# Patient Record
Sex: FEMALE | Race: WHITE | NOT HISPANIC OR LATINO | URBAN - METROPOLITAN AREA
[De-identification: names, ages, dates, MRNs, and addresses within clinical notes are randomized per-mention and may not be internally consistent; named-entity substitution may affect disease eponyms.]

---

## 2018-05-01 ENCOUNTER — APPOINTMENT (RX ONLY)
Dept: URBAN - METROPOLITAN AREA CLINIC 49 | Facility: CLINIC | Age: 28
Setting detail: DERMATOLOGY
End: 2018-05-01

## 2018-05-01 DIAGNOSIS — L23.9 ALLERGIC CONTACT DERMATITIS, UNSPECIFIED CAUSE: ICD-10-CM

## 2018-05-01 PROCEDURE — 99202 OFFICE O/P NEW SF 15 MIN: CPT

## 2018-05-01 PROCEDURE — ? PRESCRIPTION

## 2018-05-01 PROCEDURE — ? COUNSELING

## 2018-05-01 RX ORDER — TRIAMCINOLONE ACETONIDE 1 MG/G
CREAM TOPICAL
Qty: 1 | Refills: 1 | Status: ERX | COMMUNITY
Start: 2018-05-01

## 2018-05-01 RX ADMIN — TRIAMCINOLONE ACETONIDE: 1 CREAM TOPICAL at 20:13

## 2018-05-01 ASSESSMENT — LOCATION ZONE DERM
LOCATION ZONE: ARM
LOCATION ZONE: TRUNK
LOCATION ZONE: NECK

## 2018-05-01 ASSESSMENT — LOCATION SIMPLE DESCRIPTION DERM
LOCATION SIMPLE: LEFT ANTERIOR NECK
LOCATION SIMPLE: ABDOMEN
LOCATION SIMPLE: LEFT FOREARM

## 2018-05-01 ASSESSMENT — LOCATION DETAILED DESCRIPTION DERM
LOCATION DETAILED: LEFT SUPERIOR ANTERIOR NECK
LOCATION DETAILED: LEFT VENTRAL DISTAL FOREARM
LOCATION DETAILED: LEFT INFERIOR LATERAL NECK
LOCATION DETAILED: LEFT LATERAL ABDOMEN

## 2018-05-01 NOTE — HPI: RASH
Is This A New Presentation, Or A Follow-Up?: Rash
Additional History: Pt states that  has  has a similar rash but he was diagnosed with mild eczema. Pt is currently trying to get pregnant.

## 2018-05-04 ENCOUNTER — APPOINTMENT (RX ONLY)
Dept: URBAN - METROPOLITAN AREA CLINIC 49 | Facility: CLINIC | Age: 28
Setting detail: DERMATOLOGY
End: 2018-05-04

## 2018-05-04 DIAGNOSIS — L23.9 ALLERGIC CONTACT DERMATITIS, UNSPECIFIED CAUSE: ICD-10-CM

## 2018-05-04 PROCEDURE — ? COUNSELING

## 2018-05-04 PROCEDURE — 99213 OFFICE O/P EST LOW 20 MIN: CPT

## 2018-05-04 PROCEDURE — ? PRESCRIPTION

## 2018-05-04 RX ORDER — PREDNISONE 5 MG/1
TABLET ORAL
Qty: 1 | Refills: 0 | Status: ERX | COMMUNITY
Start: 2018-05-04

## 2018-05-04 RX ADMIN — PREDNISONE: 5 TABLET ORAL at 19:08

## 2018-05-04 ASSESSMENT — LOCATION DETAILED DESCRIPTION DERM
LOCATION DETAILED: RIGHT INFERIOR ANTERIOR NECK
LOCATION DETAILED: LEFT VENTRAL LATERAL DISTAL FOREARM
LOCATION DETAILED: RIGHT VENTRAL PROXIMAL FOREARM
LOCATION DETAILED: LEFT VENTRAL DISTAL FOREARM
LOCATION DETAILED: LEFT LATERAL ABDOMEN
LOCATION DETAILED: RIGHT LATERAL ABDOMEN

## 2018-05-04 ASSESSMENT — LOCATION ZONE DERM
LOCATION ZONE: NECK
LOCATION ZONE: ARM
LOCATION ZONE: TRUNK

## 2018-05-04 ASSESSMENT — LOCATION SIMPLE DESCRIPTION DERM
LOCATION SIMPLE: RIGHT ANTERIOR NECK
LOCATION SIMPLE: RIGHT FOREARM
LOCATION SIMPLE: ABDOMEN
LOCATION SIMPLE: LEFT FOREARM

## 2018-05-04 NOTE — HPI: RASH (ALLERGIC CONTACT DERMATITIS)
Response To Previous Treatment?: topical treatments are ineffective
Is This A New Presentation, Or A Follow-Up?: Follow Up Allergic Contact Dermatitis

## 2018-05-18 ENCOUNTER — APPOINTMENT (RX ONLY)
Dept: URBAN - METROPOLITAN AREA CLINIC 49 | Facility: CLINIC | Age: 28
Setting detail: DERMATOLOGY
End: 2018-05-18

## 2018-05-18 DIAGNOSIS — L81.8 OTHER SPECIFIED DISORDERS OF PIGMENTATION: ICD-10-CM

## 2018-05-18 PROCEDURE — 99212 OFFICE O/P EST SF 10 MIN: CPT

## 2018-05-18 PROCEDURE — ? COUNSELING

## 2018-05-18 ASSESSMENT — LOCATION SIMPLE DESCRIPTION DERM
LOCATION SIMPLE: LEFT FOREARM
LOCATION SIMPLE: RIGHT FOREARM

## 2018-05-18 ASSESSMENT — LOCATION DETAILED DESCRIPTION DERM
LOCATION DETAILED: RIGHT VENTRAL PROXIMAL FOREARM
LOCATION DETAILED: LEFT VENTRAL LATERAL DISTAL FOREARM

## 2018-05-18 ASSESSMENT — LOCATION ZONE DERM: LOCATION ZONE: ARM

## 2020-06-05 ENCOUNTER — VIRTUAL VISIT (OUTPATIENT)
Dept: FAMILY MEDICINE CLINIC | Age: 30
End: 2020-06-05

## 2020-06-05 RX ORDER — DROSPIRENONE AND ETHINYL ESTRADIOL 0.02-3(28)
KIT ORAL DAILY
COMMUNITY

## 2020-06-05 NOTE — PROGRESS NOTES
1. Have you been to the ER, urgent care clinic since your last visit? Hospitalized since your last visit? No    2. Have you seen or consulted any other health care providers outside of the [address] [place] [address] since your last visit? Include any pap smears or colon screening. Yes, saw her obgyn in 5/2020 for pap smear. Chief Complaint   Patient presents with    New Patient     Previous PCP was in [address] [place] [address] in [place].

## 2020-06-08 ENCOUNTER — HOSPITAL ENCOUNTER (OUTPATIENT)
Dept: LAB | Age: 30
Discharge: HOME | End: 2020-06-08
Payer: OTHER GOVERNMENT

## 2020-06-08 ENCOUNTER — OFFICE VISIT (OUTPATIENT)
Dept: FAMILY MEDICINE CLINIC | Age: 30
End: 2020-06-08

## 2020-06-08 VITALS
TEMPERATURE: 97.9 F | DIASTOLIC BLOOD PRESSURE: 81 MMHG | RESPIRATION RATE: 18 BRPM | SYSTOLIC BLOOD PRESSURE: 126 MMHG | OXYGEN SATURATION: 100 % | HEART RATE: 69 BPM

## 2020-06-08 DIAGNOSIS — Z00.00 WELL WOMAN EXAM (NO GYNECOLOGICAL EXAM): ICD-10-CM

## 2020-06-08 DIAGNOSIS — E55.9 VITAMIN D DEFICIENCY: ICD-10-CM

## 2020-06-08 DIAGNOSIS — D72.818 OTHER DECREASED WHITE BLOOD CELL (WBC) COUNT: ICD-10-CM

## 2020-06-08 DIAGNOSIS — Z00.00 WELL WOMAN EXAM (NO GYNECOLOGICAL EXAM): Primary | ICD-10-CM

## 2020-06-08 LAB
25(OH)D3 SERPL-MCNC: 30.2 NG/ML (ref 30–100)
ALBUMIN SERPL-MCNC: 3.9 G/DL (ref 3.4–5)
ALBUMIN/GLOB SERPL: 1.1 {RATIO} (ref 0.8–1.7)
ALP SERPL-CCNC: 45 U/L (ref 45–117)
ALT SERPL-CCNC: 45 U/L (ref 13–56)
ANION GAP SERPL CALC-SCNC: 5 MMOL/L (ref 3–18)
AST SERPL-CCNC: 25 U/L (ref 10–38)
BILIRUB SERPL-MCNC: 0.4 MG/DL (ref 0.2–1)
BUN SERPL-MCNC: 8 MG/DL (ref 7–18)
BUN/CREAT SERPL: 10 (ref 12–20)
CALCIUM SERPL-MCNC: 9.3 MG/DL (ref 8.5–10.1)
CHLORIDE SERPL-SCNC: 109 MMOL/L (ref 100–111)
CHOLEST SERPL-MCNC: 150 MG/DL
CO2 SERPL-SCNC: 28 MMOL/L (ref 21–32)
CREAT SERPL-MCNC: 0.8 MG/DL (ref 0.6–1.3)
ERYTHROCYTE [DISTWIDTH] IN BLOOD BY AUTOMATED COUNT: 12.5 % (ref 11.6–14.5)
GLOBULIN SER CALC-MCNC: 3.5 G/DL (ref 2–4)
GLUCOSE SERPL-MCNC: 81 MG/DL (ref 74–99)
HCT VFR BLD AUTO: 40.4 % (ref 35–45)
HDLC SERPL-MCNC: 58 MG/DL (ref 40–60)
HDLC SERPL: 2.6 {RATIO} (ref 0–5)
HGB BLD-MCNC: 12.9 G/DL (ref 12–16)
LDLC SERPL CALC-MCNC: 69.2 MG/DL (ref 0–100)
LIPID PROFILE,FLP: NORMAL
MCH RBC QN AUTO: 28.1 PG (ref 24–34)
MCHC RBC AUTO-ENTMCNC: 31.9 G/DL (ref 31–37)
MCV RBC AUTO: 88 FL (ref 74–97)
PLATELET # BLD AUTO: 262 K/UL (ref 135–420)
PMV BLD AUTO: 13.8 FL (ref 9.2–11.8)
POTASSIUM SERPL-SCNC: 4.5 MMOL/L (ref 3.5–5.5)
PROT SERPL-MCNC: 7.4 G/DL (ref 6.4–8.2)
RBC # BLD AUTO: 4.59 M/UL (ref 4.2–5.3)
SODIUM SERPL-SCNC: 142 MMOL/L (ref 136–145)
TRIGL SERPL-MCNC: 114 MG/DL (ref ?–150)
VLDLC SERPL CALC-MCNC: 22.8 MG/DL
WBC # BLD AUTO: 4.4 K/UL (ref 4.6–13.2)

## 2020-06-08 PROCEDURE — 85027 COMPLETE CBC AUTOMATED: CPT

## 2020-06-08 PROCEDURE — 80053 COMPREHEN METABOLIC PANEL: CPT

## 2020-06-08 PROCEDURE — 82306 VITAMIN D 25 HYDROXY: CPT

## 2020-06-08 PROCEDURE — 80061 LIPID PANEL: CPT

## 2020-06-08 PROCEDURE — 36415 COLL VENOUS BLD VENIPUNCTURE: CPT

## 2020-06-08 NOTE — PROGRESS NOTES
1. Have you been to the ER, urgent care clinic since your last visit? Hospitalized since your last visit? No.     2. Have you seen or consulted any other health care providers outside of the [address] [place] [address] since your last visit? Include any pap smears or colon screening.  No.    Chief Complaint   Patient presents with    Complete Physical

## 2020-06-08 NOTE — PROGRESS NOTES
Kaycee Gregory               [phone]  Subjective:   26 y.o. female for Well Woman Check. Her gyne and breast care is done elsewhere by her Ob-Gyne physician. There is no problem list on file for this patient. There are no active problems to display for this patient. Current Outpatient Medications   Medication Sig Dispense Refill    PRENATAL 148-IRON-FOLATE 6-DHA PO Take  by mouth daily.  drospirenone-ethinyl estradioL (Yolanda, 28,) 3-0.02 mg tab Take  by mouth daily. Allergies   Allergen Reactions    Beef Derived (Bovine) Diarrhea and Nausea and Vomiting     History reviewed. No pertinent past medical history. Past Surgical History:   Procedure Laterality Date    HX  SECTION       Family History   Problem Relation Age of Onset    No Known Problems Mother     COPD Father     No Known Problems Brother      Social History     Tobacco Use    Smoking status: Never Smoker    Smokeless tobacco: Never Used   Substance Use Topics    Alcohol use: Yes     Alcohol/week: 4.0 standard drinks     Types: 4 Glasses of wine per week             ROS: Feeling generally well. No TIA's or unusual headaches, no dysphagia. No prolonged cough. No dyspnea or chest pain on exertion. No abdominal pain, change in bowel habits, black or bloody stools. Endorses loose/soft stools for the past couple of weeks. Has one stool a day, brown in color . Recently moved here from [place], lives in [place]. No urinary tract symptoms. No new or unusual musculoskeletal symptoms. No HEENT symptoms. No psychiatric symptoms. Specific concerns today: none    Objective: The patient appears well, alert, oriented x 3, in no distress. Visit Vitals  /81   Pulse 69   Temp 97.9 °F (36.6 °C) (Oral)   Resp 18   LMP 2020   SpO2 100%     ENT normal.  Neck supple. No adenopathy or thyromegaly. GALI.  Lungs are clear, good air entry, no wheezes, rhonchi or rales. S1 and S2 normal, no murmurs, regular rate and rhythm. Abdomen soft without tenderness, guarding, mass or organomegaly. Extremities show no edema, normal peripheral pulses. Neurological is normal, no focal findings. Breast and Pelvic exams are deferred. Assessment/Plan:   Well Woman  limit alcohol consumption, continue present diet with no restrictions, continue present plan, routine labs ordered    ICD-10-CM ICD-9-CM     1. Well woman exam (no gynecological exam) R48.35 I45.3 METABOLIC PANEL, COMPREHENSIVE Here to establish care  No medical history or current problems  Labs appropriate for age and medical condidions      LIPID PANEL       CBC W/O DIFF     [V70.0]    2. Vitamin D deficiency E55.9 268.9 VITAMIN D, 25 HYDROXY F/u vit d level  Education on osteoporosis prevention provided, recommend daily intake of calcium and vitd along with weight bearing exercises       An After Visit Summary was printed and given to the patient. All diagnosis have been discussed with the patient and all of the patient's questions have been answered. Follow-up and Dispositions    · Return in about 1 year (around 6/8/2021) for Annual physical, w/o gyn, 30 minutes, office visit. Health Maintenance Due   Topic Date Due    DTaP/Tdap/Td series (1 - Tdap) 06/10/2010    PAP AKA CERVICAL CYTOLOGY  06/10/2010         MICHELLE Gomez-[address].   [facility], [place] [address]

## 2020-06-11 NOTE — PROGRESS NOTES
Patient is aware that her labs showed her WBC count was slightly low and needed to recheck her labs again in 4 weeks. SHe is scheduled for 7/14/2020 for lab appt.

## 2020-07-14 ENCOUNTER — HOSPITAL ENCOUNTER (OUTPATIENT)
Dept: LAB | Age: 30
Discharge: HOME | End: 2020-07-14
Payer: OTHER GOVERNMENT

## 2020-07-14 DIAGNOSIS — D72.818 OTHER DECREASED WHITE BLOOD CELL (WBC) COUNT: ICD-10-CM

## 2020-07-14 LAB
BASOPHILS # BLD: 0 K/UL (ref 0–0.1)
BASOPHILS NFR BLD: 1 % (ref 0–2)
DIFFERENTIAL METHOD BLD: ABNORMAL
EOSINOPHIL # BLD: 0.1 K/UL (ref 0–0.4)
EOSINOPHIL NFR BLD: 2 % (ref 0–5)
ERYTHROCYTE [DISTWIDTH] IN BLOOD BY AUTOMATED COUNT: 12.5 % (ref 11.6–14.5)
HCT VFR BLD AUTO: 39.3 % (ref 35–45)
HGB BLD-MCNC: 12.7 G/DL (ref 12–16)
LYMPHOCYTES # BLD: 1.4 K/UL (ref 0.9–3.6)
LYMPHOCYTES NFR BLD: 45 % (ref 21–52)
MCH RBC QN AUTO: 28.7 PG (ref 24–34)
MCHC RBC AUTO-ENTMCNC: 32.3 G/DL (ref 31–37)
MCV RBC AUTO: 88.7 FL (ref 74–97)
MONOCYTES # BLD: 0.4 K/UL (ref 0.05–1.2)
MONOCYTES NFR BLD: 11 % (ref 3–10)
NEUTS SEG # BLD: 1.3 K/UL (ref 1.8–8)
NEUTS SEG NFR BLD: 41 % (ref 40–73)
PLATELET # BLD AUTO: 213 K/UL (ref 135–420)
PMV BLD AUTO: 14.1 FL (ref 9.2–11.8)
RBC # BLD AUTO: 4.43 M/UL (ref 4.2–5.3)
WBC # BLD AUTO: 3.1 K/UL (ref 4.6–13.2)

## 2020-07-14 PROCEDURE — 36415 COLL VENOUS BLD VENIPUNCTURE: CPT

## 2020-07-14 PROCEDURE — 85025 COMPLETE CBC W/AUTO DIFF WBC: CPT

## 2020-07-21 ENCOUNTER — TELEPHONE (OUTPATIENT)
Dept: FAMILY MEDICINE CLINIC | Age: 30
End: 2020-07-21

## 2020-07-21 DIAGNOSIS — D70.9 NEUTROPENIA, UNSPECIFIED TYPE (HCC): Primary | ICD-10-CM

## 2020-07-22 NOTE — TELEPHONE ENCOUNTER
Pt called for a second time, I notified her that you've been with patients all day and will call her once you read them . Her # [phone].

## 2020-07-23 NOTE — TELEPHONE ENCOUNTER
Returned call. She talked to her mom and her mom has low WBC also, she had workup and there were no significant findings. Will refer to hematology for further workup, suggested she bring her mothers records with her on the visit    Referral placed    All diagnosis have been discussed with the patient and all of the patient's questions have been answered.

## 2020-09-22 ENCOUNTER — OFFICE VISIT (OUTPATIENT)
Age: 30
End: 2020-09-22
Payer: OTHER GOVERNMENT

## 2020-09-22 ENCOUNTER — HOSPITAL ENCOUNTER (OUTPATIENT)
Dept: INFUSION THERAPY | Age: 30
Discharge: HOME | End: 2020-09-22
Payer: OTHER GOVERNMENT

## 2020-09-22 VITALS
WEIGHT: 123 LBS | OXYGEN SATURATION: 97 % | SYSTOLIC BLOOD PRESSURE: 129 MMHG | HEART RATE: 65 BPM | DIASTOLIC BLOOD PRESSURE: 81 MMHG | RESPIRATION RATE: 18 BRPM | TEMPERATURE: 98 F

## 2020-09-22 VITALS
DIASTOLIC BLOOD PRESSURE: 81 MMHG | RESPIRATION RATE: 18 BRPM | SYSTOLIC BLOOD PRESSURE: 129 MMHG | OXYGEN SATURATION: 97 % | TEMPERATURE: 98 F | HEART RATE: 65 BPM

## 2020-09-22 DIAGNOSIS — D70.9 NEUTROPENIA, UNSPECIFIED TYPE (HCC): Primary | ICD-10-CM

## 2020-09-22 DIAGNOSIS — D70.9 NEUTROPENIA, UNSPECIFIED TYPE (HCC): ICD-10-CM

## 2020-09-22 LAB
ALBUMIN SERPL-MCNC: 4.2 G/DL (ref 3.4–5)
ALBUMIN/GLOB SERPL: 1.1 {RATIO} (ref 0.8–1.7)
ALP SERPL-CCNC: 54 U/L (ref 45–117)
ALT SERPL-CCNC: 62 U/L (ref 13–56)
ANION GAP SERPL CALC-SCNC: 3 MMOL/L (ref 3–18)
AST SERPL-CCNC: 34 U/L (ref 10–38)
BASOPHILS # BLD: 0 K/UL (ref 0–0.1)
BASOPHILS NFR BLD: 1 % (ref 0–2)
BILIRUB SERPL-MCNC: 0.5 MG/DL (ref 0.2–1)
BUN SERPL-MCNC: 9 MG/DL (ref 7–18)
BUN/CREAT SERPL: 13 (ref 12–20)
CALCIUM SERPL-MCNC: 9.3 MG/DL (ref 8.5–10.1)
CHLORIDE SERPL-SCNC: 107 MMOL/L (ref 100–111)
CO2 SERPL-SCNC: 27 MMOL/L (ref 21–32)
CREAT SERPL-MCNC: 0.7 MG/DL (ref 0.6–1.3)
DIFFERENTIAL METHOD BLD: ABNORMAL
EOSINOPHIL # BLD: 0 K/UL (ref 0–0.4)
EOSINOPHIL NFR BLD: 1 % (ref 0–5)
ERYTHROCYTE [DISTWIDTH] IN BLOOD BY AUTOMATED COUNT: 12.3 % (ref 11.6–14.5)
FERRITIN SERPL-MCNC: 11 NG/ML (ref 8–388)
FOLATE SERPL-MCNC: >20 NG/ML (ref 3.1–17.5)
GLOBULIN SER CALC-MCNC: 3.8 G/DL (ref 2–4)
GLUCOSE SERPL-MCNC: 83 MG/DL (ref 74–99)
HCT VFR BLD AUTO: 47.8 % (ref 35–45)
HGB BLD-MCNC: 15.4 G/DL (ref 12–16)
IRON SATN MFR SERPL: 23 % (ref 20–50)
IRON SERPL-MCNC: 103 UG/DL (ref 50–175)
LYMPHOCYTES # BLD: 1.6 K/UL (ref 0.9–3.6)
LYMPHOCYTES NFR BLD: 33 % (ref 21–52)
MCH RBC QN AUTO: 28.8 PG (ref 24–34)
MCHC RBC AUTO-ENTMCNC: 32.2 G/DL (ref 31–37)
MCV RBC AUTO: 89.5 FL (ref 74–97)
MONOCYTES # BLD: 0.3 K/UL (ref 0.05–1.2)
MONOCYTES NFR BLD: 6 % (ref 3–10)
NEUTS SEG # BLD: 2.8 K/UL (ref 1.8–8)
NEUTS SEG NFR BLD: 59 % (ref 40–73)
PLATELET # BLD AUTO: 264 K/UL (ref 135–420)
PMV BLD AUTO: 12.4 FL (ref 9.2–11.8)
POTASSIUM SERPL-SCNC: 4.2 MMOL/L (ref 3.5–5.5)
PROT SERPL-MCNC: 8 G/DL (ref 6.4–8.2)
RBC # BLD AUTO: 5.34 M/UL (ref 4.2–5.3)
SODIUM SERPL-SCNC: 137 MMOL/L (ref 136–145)
TIBC SERPL-MCNC: 445 UG/DL (ref 250–450)
TSH SERPL DL<=0.05 MIU/L-ACNC: 1.51 UIU/ML (ref 0.36–3.74)
VIT B12 SERPL-MCNC: 337 PG/ML (ref 211–911)
WBC # BLD AUTO: 4.7 K/UL (ref 4.6–13.2)

## 2020-09-22 PROCEDURE — 99204 OFFICE O/P NEW MOD 45 MIN: CPT | Performed by: INTERNAL MEDICINE

## 2020-09-22 PROCEDURE — 85025 COMPLETE CBC W/AUTO DIFF WBC: CPT

## 2020-09-22 PROCEDURE — 83540 ASSAY OF IRON: CPT

## 2020-09-22 PROCEDURE — 86038 ANTINUCLEAR ANTIBODIES: CPT

## 2020-09-22 PROCEDURE — 82784 ASSAY IGA/IGD/IGG/IGM EACH: CPT

## 2020-09-22 PROCEDURE — 82728 ASSAY OF FERRITIN: CPT

## 2020-09-22 PROCEDURE — 36415 COLL VENOUS BLD VENIPUNCTURE: CPT

## 2020-09-22 PROCEDURE — 86618 LYME DISEASE ANTIBODY: CPT

## 2020-09-22 PROCEDURE — 84443 ASSAY THYROID STIM HORMONE: CPT

## 2020-09-22 PROCEDURE — 82607 VITAMIN B-12: CPT

## 2020-09-22 PROCEDURE — 87389 HIV-1 AG W/HIV-1&-2 AB AG IA: CPT

## 2020-09-22 PROCEDURE — 80053 COMPREHEN METABOLIC PANEL: CPT

## 2020-09-22 NOTE — PROGRESS NOTES
[facility]  [id number] [place] [address], [address]  [place], [facility]  Office Phone: [phone]  Fax: [phone]      Reason for visit:  Abnormal White Blood Cell Count (Neutropenia)      HPI:   Connie Will is a 31 y.o.  female who I was asked to see in consultation at the request of Rashad Fisher NP for evaluation for leukopenia. On 2020, WBC 3.1, H&H 12.7/39.3, platelet 444. ANC 1.3 (one-point 88). I saw and examined patient today. She is awake alert oriented x3. On review of systems she denies any fevers, chills, repeated infection, nausea vomiting abdominal pain. No focal neurologic deficit. All other point of review of system have been reviewed and were negative. ECOG performance status 0. Independent with ADLs and IADLs    DX   Encounter Diagnosis   Name Primary?  Neutropenia, unspecified type ([facility] Yes          History reviewed. No pertinent past medical history. Past Surgical History:   Procedure Laterality Date    HX  SECTION  2019     Social History     Socioeconomic History    Marital status:      Spouse name: Not on file    Number of children: Not on file    Years of education: Not on file    Highest education level: Not on file   Tobacco Use    Smoking status: Never Smoker    Smokeless tobacco: Never Used   Substance and Sexual Activity    Alcohol use: Yes     Alcohol/week: 2.0 standard drinks     Types: 2 Glasses of wine per week    Drug use: Never    Sexual activity: Yes     Family History   Problem Relation Age of Onset    No Known Problems Mother     COPD Father     No Known Problems Brother        Current Outpatient Medications   Medication Sig Dispense Refill    PRENATAL 148-IRON-FOLATE 6-DHA PO Take  by mouth daily.  drospirenone-ethinyl estradioL (Yolanda, Nuvia,) 3-0.02 mg tab Take  by mouth daily.          Allergies   Allergen Reactions    Beef Derived (Bovine) Diarrhea and Nausea and Vomiting Patient states \"just diarrhea, anything from a cow\"       Review of Systems  As per HPi    Objective:  Physical Exam:  Visit Vitals  /81   Pulse 65   Temp 98 °F (36.7 °C) (Oral)   Resp 18   Wt 55.8 kg (123 lb)   LMP 09/10/2020   SpO2 97%       General:  Alert, cooperative, no distress, appears stated age. Head:  Normocephalic, without obvious abnormality, atraumatic. Eyes:  Conjunctivae/corneas clear. PERRL, EOMs intact. Throat: Lips, mucosa, and tongue normal.    Neck: Supple, symmetrical, trachea midline, no adenopathy, thyroid: no enlargement/tenderness/nodules   Back:   Symmetric, no curvature. ROM normal. No CVA tenderness. Lungs:   Clear to auscultation bilaterally. Chest wall:  No tenderness or deformity. Heart:  Regular rate and rhythm, S1, S2 normal, no murmur, click, rub or gallop. Abdomen:   Soft, non-tender. Bowel sounds normal. No masses,  No organomegaly. Extremities: Extremities normal, atraumatic, no cyanosis or edema. Skin: Skin color, texture, turgor normal. No rashes or lesions. Lymph nodes: Cervical, supraclavicular, and axillary nodes normal.   Neurologic: CNII-XII intact. Diagnostic Imaging     No results found for this or any previous visit. Lab Results  Lab Results   Component Value Date/Time    WBC 3.1 (L) 07/14/2020 09:17 AM    HGB 12.7 07/14/2020 09:17 AM    HCT 39.3 07/14/2020 09:17 AM    PLATELET 021 91/54/3642 09:17 AM    MCV 88.7 07/14/2020 09:17 AM       Lab Results   Component Value Date/Time    Sodium 142 06/08/2020 10:40 AM    Potassium 4.5 06/08/2020 10:40 AM    Chloride 109 06/08/2020 10:40 AM    CO2 28 06/08/2020 10:40 AM    Anion gap 5 06/08/2020 10:40 AM    Glucose 81 06/08/2020 10:40 AM    BUN 8 06/08/2020 10:40 AM    Creatinine 0.80 06/08/2020 10:40 AM    BUN/Creatinine ratio 10 (L) 06/08/2020 10:40 AM    GFR est AA >60 06/08/2020 10:40 AM    GFR est non-AA >60 06/08/2020 10:40 AM    Calcium 9.3 06/08/2020 10:40 AM    Alk.  phosphatase 45 06/08/2020 10:40 AM    Protein, total 7.4 06/08/2020 10:40 AM    Albumin 3.9 06/08/2020 10:40 AM    Globulin 3.5 06/08/2020 10:40 AM    A-G Ratio 1.1 06/08/2020 10:40 AM    ALT (SGPT) 45 06/08/2020 10:40 AM   Follow-up with PCP for health maintenance  Assessment/Plan:  31 y.o. female with   1. Neutropenia, unspecified type ([facility]  I had a long discussion with patient regarding her very mild neutropenia 1.3 (1.88.0). She has a family history of her mother who also had neutropenia and she said. I reviewed her medication there is no culprit medication for this. She denies any fevers or repeated infections. She has no history of rheumatoid arthritis. She grew up in [place] (check lyme serology)  - CBC WITH AUTOMATED DIFF; Future  - FERRITIN; Future  - IRON PROFILE; Future  - METABOLIC PANEL, COMPREHENSIVE; Future  - PERIPHERAL SMEAR; Future  - TSH 3RD GENERATION; Future  - VITAMIN B12 & FOLATE; Future  - HIV 1/2 AG/AB, 4TH GENERATION,W RFLX CONFIRM; Future  - ANTINUCLEAR ANTIBODIES, IFA;  Future  - IgG,A and M to r/o CIVD  - Lyme serology    Return in 2 weeks-Virtual

## 2020-09-22 NOTE — PROGRESS NOTES
Jelly Maria is a 31 y.o. female presenting today for a new patient appointment. Patient is ambulatory with no assistive devices and denies any complaints. Chief Complaint   Patient presents with    Abnormal White Blood Cell Count     Neutropenia       Visit Vitals  /81   Pulse 65   Temp 98 °F (36.7 °C) (Oral)   Resp 18   Wt 123 lb (55.8 kg)   LMP 09/10/2020   SpO2 97%       Current Outpatient Medications   Medication Sig    PRENATAL 148-IRON-FOLATE 6-DHA PO Take  by mouth daily.  drospirenone-ethinyl estradioL (Yolanda, 28,) 3-0.02 mg tab Take  by mouth daily. No current facility-administered medications for this visit. Medications no longer taking/discontinued: Yolanda    Fall Risk Assessment, last 12 mths 6/5/2020   Able to walk? Yes   Fall in past 12 months? No       3 most recent PHQ Screens 6/8/2020   Little interest or pleasure in doing things Not at all   Feeling down, depressed, irritable, or hopeless Not at all   Total Score PHQ 2 0       No flowsheet data found.     Learning Assessment 6/5/2020   PRIMARY LEARNER Patient   HIGHEST LEVEL OF EDUCATION - PRIMARY LEARNER  > 4 YEARS OF COLLEGE   BARRIERS PRIMARY LEARNER NONE   CO-LEARNER CAREGIVER No   PRIMARY LANGUAGE ENGLISH   LEARNER PREFERENCE PRIMARY DEMONSTRATION     READING   ANSWERED BY self   RELATIONSHIP SELF

## 2020-09-22 NOTE — PROGRESS NOTES
[facility] Progress Note    Date: 2020    Name: Willie Choi    MRN: [id number]         : 1990    Peripheral Lab Draw      Ms. Zuluaga Brothers to [facility], ambulatory at [id number] accompanied by self. Pt was assessed and education was provided. Ms. Ambar Dahl vitals were reviewed and patient was observed for 5 minutes prior to treatment. Visit Vitals  /81   Pulse 65   Temp 98 °F (36.7 °C) (Oral)   Resp 18   SpO2 97%     Recent Results (from the past 12 hour(s))   METABOLIC PANEL, COMPREHENSIVE    Collection Time: 20 11:05 AM   Result Value Ref Range    Sodium 137 136 - 145 mmol/L    Potassium 4.2 3.5 - 5.5 mmol/L    Chloride 107 100 - 111 mmol/L    CO2 27 21 - 32 mmol/L    Anion gap 3 3.0 - 18 mmol/L    Glucose 83 74 - 99 mg/dL    BUN 9 7.0 - 18 MG/DL    Creatinine 0.70 0.6 - 1.3 MG/DL    BUN/Creatinine ratio 13 12 - 20      GFR est AA >60 >60 ml/min/1.73m2    GFR est non-AA >60 >60 ml/min/1.73m2    Calcium 9.3 8.5 - 10.1 MG/DL    Bilirubin, total 0.5 0.2 - 1.0 MG/DL    ALT (SGPT) 62 (H) 13 - 56 U/L    AST (SGOT) 34 10 - 38 U/L    Alk. phosphatase 54 45 - 117 U/L    Protein, total 8.0 6.4 - 8.2 g/dL    Albumin 4.2 3.4 - 5.0 g/dL    Globulin 3.8 2.0 - 4.0 g/dL    A-G Ratio 1.1 0.8 - 1.7     TSH 3RD GENERATION    Collection Time: 20 11:05 AM   Result Value Ref Range    TSH 1.51 0.36 - 3.74 uIU/mL   CBC WITH AUTOMATED DIFF    Collection Time: 20 11:05 AM   Result Value Ref Range    WBC 4.7 4.6 - 13.2 K/uL    RBC 5.34 (H) 4.20 - 5.30 M/uL    HGB 15.4 12.0 - 16.0 g/dL    HCT 47.8 (H) 35.0 - 45.0 %    MCV 89.5 74.0 - 97.0 FL    MCH 28.8 24.0 - 34.0 PG    MCHC 32.2 31.0 - 37.0 g/dL    RDW 12.3 11.6 - 14.5 %    PLATELET 560 545 - 468 K/uL    MPV 12.4 (H) 9.2 - 11.8 FL    NEUTROPHILS 59 40 - 73 %    LYMPHOCYTES 33 21 - 52 %    MONOCYTES 6 3 - 10 %    EOSINOPHILS 1 0 - 5 %    BASOPHILS 1 0 - 2 %    ABS. NEUTROPHILS 2.8 1.8 - 8.0 K/UL    ABS. LYMPHOCYTES 1.6 0.9 - 3.6 K/UL    ABS.  MONOCYTES 0.3 0.05 - 1.2 K/UL    ABS. EOSINOPHILS 0.0 0.0 - 0.4 K/UL    ABS. BASOPHILS 0.0 0.0 - 0.1 K/UL    DF AUTOMATED         Blood obtained peripherally from left arm x 1 attempt with butterfly needle and sent to lab for Cbc w/diff, Lyme Ab total, Immunoglobulins, antinuclear AB, Hiv 1/2 AG/AB, Vitamin B12 & Folate, Tsh, Cmp, Iron Profile, Ferritin and Peripheral Smear per written orders. No bleeding or hematoma noted at site. Gauze and coban applied. Ms. Hétcor Wynn tolerated the phlebotomy, and had no complaints. Patient armband removed and shredded. Ms. Héctor Wynn was discharged from [place] [address] in stable condition at .      Tuan Murphy Phlebotomist PCT  September 22, 2020  1:49 PM

## 2020-09-23 LAB
ANA TITR SER IF: NEGATIVE {TITER}
B BURGDOR IGG+IGM SER-ACNC: <0.91 ISR (ref 0–0.9)
HIV 1+2 AB+HIV1 P24 AG SERPL QL IA: NONREACTIVE
HIV12 RESULT COMMENT, HHIVC: NORMAL
IGA SERPL-MCNC: 263 MG/DL (ref 70–400)
IGG SERPL-MCNC: 1410 MG/DL (ref 700–1600)
IGM SERPL-MCNC: 168 MG/DL (ref 40–230)
PERIPHERAL SMEAR,PSM: NORMAL

## 2020-09-28 ENCOUNTER — DOCUMENTATION ONLY (OUTPATIENT)
Age: 30
End: 2020-09-28

## 2020-09-28 DIAGNOSIS — D50.8 IRON DEFICIENCY ANEMIA SECONDARY TO INADEQUATE DIETARY IRON INTAKE: Primary | ICD-10-CM

## 2020-09-28 NOTE — PROGRESS NOTES
Patient refused iron infusion and would rather take oral iron supplementation. I advised her to take Ferrous Sulfate 325mg PO every other day. Repeat labs in 8 weeks per her request. All her questions were answered to her satisfaction. Patient verbalized understanding.

## 2020-10-06 ENCOUNTER — VIRTUAL VISIT (OUTPATIENT)
Age: 30
End: 2020-10-06
Payer: OTHER GOVERNMENT

## 2020-10-06 DIAGNOSIS — E61.1 IRON DEFICIENCY: ICD-10-CM

## 2020-10-06 DIAGNOSIS — D70.9 NEUTROPENIA, UNSPECIFIED TYPE (HCC): Primary | ICD-10-CM

## 2020-10-06 PROCEDURE — 99213 OFFICE O/P EST LOW 20 MIN: CPT | Performed by: INTERNAL MEDICINE

## 2020-10-06 RX ORDER — LANOLIN ALCOHOL/MO/W.PET/CERES
CREAM (GRAM) TOPICAL EVERY OTHER DAY
COMMUNITY

## 2020-10-06 NOTE — PROGRESS NOTES
Polo Joseph is a 31 y.o. female presenting today for a follow-up appointment via Telehealth. Identified patient using two identifiers (full name and ). Patient denies any complaints. Provider was advised. Chief Complaint   Patient presents with    Abnormal White Blood Cell Count     Neutropenia       Current Outpatient Medications   Medication Sig    ferrous sulfate (Iron) 325 mg (65 mg iron) tablet Take  by mouth every other day.  PRENATAL 148-IRON-FOLATE 6-DHA PO Take  by mouth daily.  drospirenone-ethinyl estradioL (Yolanda, 28,) 3-0.02 mg tab Take  by mouth daily. No current facility-administered medications for this visit. Fall Risk Assessment, last 12 mths 2020   Able to walk? Yes   Fall in past 12 months? No       3 most recent PHQ Screens 2020   Little interest or pleasure in doing things Not at all   Feeling down, depressed, irritable, or hopeless Not at all   Total Score PHQ 2 0       No flowsheet data found. 1. Have you been to the ER, urgent care clinic since your last visit? Hospitalized since your last visit? No    2. Have you seen or consulted any other health care providers outside of the [address] [place] [address] since your last visit? Include any pap smears or colon screening.  No

## 2020-10-06 NOTE — PROGRESS NOTES
Amador Lopez is a 31 y.o. female who was seen by synchronous (real-time) audio- technology on 10/6/2020. [address], [address]  [facility]  Office Phone: [phone]  Fax: [phone]           Reason for visit:  Abnormal White Blood Cell Count (Neutropenia)              Assessment & Plan:   Diagnoses and all orders for this visit:    1. Neutropenia, unspecified type ([address]    2. Iron deficiency        The complexity of medical decision making for this visit is moderate       1. Neutropenia, unspecified type ([address]  I had a long discussion with patient regarding her very mild neutropenia 1.3 (1.88.0). She has a family history of her mother who also had neutropenia and she said. I reviewed her medication there is no culprit medication for this. She denies any fevers or repeated infections. She has no history of rheumatoid arthritis. She grew up in [place] (check lyme serology)  Labs on 9/22/2020 showed ferritin 11, transferrin saturation 23%, BUN 9, creatinine 0.70. Peripheral blood smear showed normochromic, normocytic smear. No white cell abnormalities. Adequate platelets with large forms. WBC 4.7, H&H 15.4/47.8, MCV 89.5, platelet 143. ANC 2.8, ALC 1.6. Normal differential.  Negative Lyme serology. Normal immunoglobulins. Negative LILIANA. Negative HIV serology. Normal B12 folate. 2. Iron deficiency: Give IV iron with Injectaferx2. She preres to take oral iron instead. She has regular menses but does not eat beef due to allergies. No BRBPR or melena. I spent at least 15 minutes on this visit with this established patient. 712  Subjective:   HPI:   Amador Lopez is a 31 y.o.  female who I was asked to see in consultation at the request of Gladys Chang NP for evaluation for leukopenia. On 7/14/2020, WBC 3.1, H&H 12.7/39.3, platelet 347. ANC 1.3 (one-point 88). I saw and examined patient today. She is awake alert oriented x3.   On review of systems she denies any fevers, chills, repeated infection, nausea vomiting abdominal pain. No focal neurologic deficit. All other point of review of system have been reviewed and were negative. ECOG performance status 0. Independent with ADLs and IADLs    Prior to Admission medications    Medication Sig Start Date End Date Taking? Authorizing Provider   ferrous sulfate (Iron) 325 mg (65 mg iron) tablet Take  by mouth every other day. Yes Provider, Historical   PRENATAL [id number] 6-DHA PO Take  by mouth daily. Yes Provider, Historical   drospirenone-ethinyl estradioL (Yolanda, 28,) 3-0.02 mg tab Take  by mouth daily. Provider, Historical     Patient Active Problem List   Diagnosis Code    Neutropenia ([facility] D70.9    Iron deficiency E61.1     Patient Active Problem List    Diagnosis Date Noted    Iron deficiency 10/06/2020    Neutropenia ([facility] 2020     Current Outpatient Medications   Medication Sig Dispense Refill    ferrous sulfate (Iron) 325 mg (65 mg iron) tablet Take  by mouth every other day.  PRENATAL 148-IRON-FOLATE 6-DHA PO Take  by mouth daily.  drospirenone-ethinyl estradioL (Yolanda, 28,) 3-0.02 mg tab Take  by mouth daily. Allergies   Allergen Reactions    Beef Derived (Bovine) Diarrhea and Nausea and Vomiting     Patient states \"just diarrhea, anything from a cow\"     History reviewed. No pertinent past medical history. Past Surgical History:   Procedure Laterality Date    HX  SECTION       Family History   Problem Relation Age of Onset    No Known Problems Mother     COPD Father     No Known Problems Brother      Social History     Tobacco Use    Smoking status: Never Smoker    Smokeless tobacco: Never Used   Substance Use Topics    Alcohol use: Yes     Alcohol/week: 2.0 standard drinks     Types: 2 Glasses of wine per week       ROS    Objective:   No flowsheet data found.    General: alert, cooperative, no distress     Additional exam findings: We discussed the expected course, resolution and complications of the diagnosis(es) in detail. Medication risks, benefits, costs, interactions, and alternatives were discussed as indicated. I advised her to contact the office if her condition worsens, changes or fails to improve as anticipated. She expressed understanding with the diagnosis(es) and plan. Chanell Staples, who was evaluated through a patient-initiated, synchronous (real-time) audio- encounter, and/or her healthcare decision maker, is aware that it is a billable service, with coverage as determined by her insurance carrier. She provided verbal consent to proceed: Yes, and patient identification was verified. It was conducted pursuant to the emergency declaration under the [facility] [address] authority and the [address] and [employer] Act. A caregiver was present when appropriate. Ability to conduct physical exam was limited. I was at home. The patient was at home.       Elly Patrick MD

## 2020-11-09 ENCOUNTER — TELEPHONE (OUTPATIENT)
Age: 30
End: 2020-11-09

## 2020-11-09 NOTE — TELEPHONE ENCOUNTER
Patient has found out she is pregnant and is concerning about her blood levels, she has been on supplements for over a month and want to make sure everything is okay with her iron levels

## 2020-11-10 NOTE — TELEPHONE ENCOUNTER
Patient was offered Injectafer last visit (patient not pregnant at that time) but declined and she prefers to take iron supplements. She has pending labs that need to be completed. Please schedule for lab. If Ferritin is <40 will plan to give Venofer now that she is pregnant.

## 2020-11-11 ENCOUNTER — HOSPITAL ENCOUNTER (OUTPATIENT)
Dept: INFUSION THERAPY | Age: 30
Discharge: HOME | End: 2020-11-11
Attending: INTERNAL MEDICINE
Payer: OTHER GOVERNMENT

## 2020-11-11 VITALS
DIASTOLIC BLOOD PRESSURE: 89 MMHG | RESPIRATION RATE: 18 BRPM | SYSTOLIC BLOOD PRESSURE: 124 MMHG | TEMPERATURE: 98.1 F | HEART RATE: 107 BPM

## 2020-11-11 DIAGNOSIS — E61.1 IRON DEFICIENCY: ICD-10-CM

## 2020-11-11 LAB
ALBUMIN SERPL-MCNC: 4.5 G/DL (ref 3.4–5)
ALBUMIN/GLOB SERPL: 1.3 {RATIO} (ref 0.8–1.7)
ALP SERPL-CCNC: 56 U/L (ref 45–117)
ALT SERPL-CCNC: 50 U/L (ref 13–56)
ANION GAP SERPL CALC-SCNC: 7 MMOL/L (ref 3–18)
AST SERPL-CCNC: 23 U/L (ref 10–38)
BASO+EOS+MONOS # BLD AUTO: 0.3 K/UL (ref 0–2.3)
BASO+EOS+MONOS NFR BLD AUTO: 8 % (ref 0.1–17)
BILIRUB SERPL-MCNC: 0.4 MG/DL (ref 0.2–1)
BUN SERPL-MCNC: 8 MG/DL (ref 7–18)
BUN/CREAT SERPL: 12 (ref 12–20)
CALCIUM SERPL-MCNC: 9.5 MG/DL (ref 8.5–10.1)
CHLORIDE SERPL-SCNC: 107 MMOL/L (ref 100–111)
CO2 SERPL-SCNC: 27 MMOL/L (ref 21–32)
CREAT SERPL-MCNC: 0.66 MG/DL (ref 0.6–1.3)
DIFFERENTIAL METHOD BLD: ABNORMAL
ERYTHROCYTE [DISTWIDTH] IN BLOOD BY AUTOMATED COUNT: 12.5 % (ref 11.5–14.5)
FERRITIN SERPL-MCNC: 28 NG/ML (ref 8–388)
GLOBULIN SER CALC-MCNC: 3.4 G/DL (ref 2–4)
GLUCOSE SERPL-MCNC: 86 MG/DL (ref 74–99)
HCT VFR BLD AUTO: 43.1 % (ref 36–48)
HGB BLD-MCNC: 14 G/DL (ref 12–16)
IRON SATN MFR SERPL: 20 % (ref 20–50)
IRON SERPL-MCNC: 64 UG/DL (ref 50–175)
LYMPHOCYTES # BLD: 1.3 K/UL (ref 1.1–5.9)
LYMPHOCYTES NFR BLD: 32 % (ref 14–44)
MCH RBC QN AUTO: 29.2 PG (ref 25–35)
MCHC RBC AUTO-ENTMCNC: 32.5 G/DL (ref 31–37)
MCV RBC AUTO: 90 FL (ref 78–102)
NEUTS SEG # BLD: 2.6 K/UL (ref 1.8–9.5)
NEUTS SEG NFR BLD: 61 % (ref 40–70)
PLATELET # BLD AUTO: 237 K/UL (ref 135–420)
POTASSIUM SERPL-SCNC: 4.1 MMOL/L (ref 3.5–5.5)
PROT SERPL-MCNC: 7.9 G/DL (ref 6.4–8.2)
RBC # BLD AUTO: 4.79 M/UL (ref 4.1–5.1)
SODIUM SERPL-SCNC: 141 MMOL/L (ref 136–145)
TIBC SERPL-MCNC: 314 UG/DL (ref 250–450)
TSH SERPL DL<=0.05 MIU/L-ACNC: 1.57 UIU/ML (ref 0.36–3.74)
WBC # BLD AUTO: 4.2 K/UL (ref 4.5–13)

## 2020-11-11 PROCEDURE — 85025 COMPLETE CBC W/AUTO DIFF WBC: CPT

## 2020-11-11 PROCEDURE — 83540 ASSAY OF IRON: CPT

## 2020-11-11 PROCEDURE — 80053 COMPREHEN METABOLIC PANEL: CPT

## 2020-11-11 PROCEDURE — 84443 ASSAY THYROID STIM HORMONE: CPT

## 2020-11-11 PROCEDURE — 36415 COLL VENOUS BLD VENIPUNCTURE: CPT

## 2020-11-11 PROCEDURE — 82728 ASSAY OF FERRITIN: CPT

## 2020-11-11 PROCEDURE — 85049 AUTOMATED PLATELET COUNT: CPT

## 2020-11-11 NOTE — PROGRESS NOTES
[facility] Progress Note    Date: 2020    Name: Misha Pineda    MRN: [id number]         : 1990      Ms. Joe Ramirez arrived to [facility] at 6278 for peripheral lab draw. Ms. Bassem Hernandez vitals were reviewed. Visit Vitals  /89   Pulse (!) 107   Temp 98.1 °F (36.7 °C)   Resp 18     Recent Results (from the past 12 hour(s))   FERRITIN    Collection Time: 20  9:58 AM   Result Value Ref Range    Ferritin 28 8 - 704 NG/ML   METABOLIC PANEL, COMPREHENSIVE    Collection Time: 20  9:58 AM   Result Value Ref Range    Sodium 141 136 - 145 mmol/L    Potassium 4.1 3.5 - 5.5 mmol/L    Chloride 107 100 - 111 mmol/L    CO2 27 21 - 32 mmol/L    Anion gap 7 3.0 - 18 mmol/L    Glucose 86 74 - 99 mg/dL    BUN 8 7.0 - 18 MG/DL    Creatinine 0.66 0.6 - 1.3 MG/DL    BUN/Creatinine ratio 12  20      GFR est AA >60 >60 ml/min/1.73m2    GFR est non-AA >60 >60 ml/min/1.73m2    Calcium 9.5 8.5 - 10.1 MG/DL    Bilirubin, total 0.4 0.2 - 1.0 MG/DL    ALT (SGPT) 50 13 - 56 U/L    AST (SGOT) 23 10 - 38 U/L    Alk. phosphatase 56 45 - 117 U/L    Protein, total 7.9 6.4 - 8.2 g/dL    Albumin 4.5 3.4 - 5.0 g/dL    Globulin 3.4 2.0 - 4.0 g/dL    A-G Ratio 1.3 0.8 - 1.7     TSH 3RD GENERATION    Collection Time: 20  9:58 AM   Result Value Ref Range    TSH 1.57 0.36 - 3.74 uIU/mL   CBC WITH 3 PART DIFF    Collection Time: 20  9:58 AM   Result Value Ref Range    WBC 4.2 (L) 4.5 - 13.0 K/uL    RBC 4.79 4.10 - 5.10 M/uL    HGB 14.0 12.0 - 16.0 g/dL    HCT 43.1 36 - 48 %    MCV 90.0 78 - 102 FL    MCH 29.2 25.0 - 35.0 PG    MCHC 32.5 31 - 37 g/dL    RDW 12.5 11.5 - 14.5 %    NEUTROPHILS 61 40 - 70 %    MIXED CELLS 8 0.1 - 17 %    LYMPHOCYTES 32 14 - 44 %    ABS. NEUTROPHILS 2.6 1.8 - 9.5 K/UL    ABS. MIXED CELLS 0.3 0.0 - 2.3 K/uL    ABS.  LYMPHOCYTES 1.3 1.1 - 5.9 K/UL    DF AUTOMATED     PLATELET COUNT    Collection Time: 20  9:58 AM   Result Value Ref Range    PLATELET 779 479 - 736 K/uL       Blood drawn for labs via Left antecubital  venipuncture x1 attempt. Gauze and coban applied to site. Ms. Tc Grewal tolerated well without complaints. Ms. Tc Grewal was discharged from [place] [address] in stable condition at 1005.     Madonna Marshall RN  November 11, 2020

## 2020-11-13 ENCOUNTER — TELEPHONE (OUTPATIENT)
Age: 30
End: 2020-11-13

## 2020-11-13 NOTE — TELEPHONE ENCOUNTER
Patient was called to schedule iron infusion, patient states she had questions for Dr. Joceline So regarding her results.    She would like a call about her labs results before scheduling iron infusion

## 2020-11-16 ENCOUNTER — TELEPHONE (OUTPATIENT)
Dept: FAMILY MEDICINE CLINIC | Age: 30
End: 2020-11-16

## 2020-11-16 NOTE — TELEPHONE ENCOUNTER
Returned call. She wants a second opinion on her neutropenia and iron deficiency treatment. Asked her to check with her insurance and find out who is in network and let me know so I can send a referral to them.

## 2020-11-23 RX ORDER — SODIUM CHLORIDE 0.9 % (FLUSH) 0.9 %
10 SYRINGE (ML) INJECTION AS NEEDED
Status: CANCELLED | OUTPATIENT
Start: 2020-11-25

## 2020-11-23 RX ORDER — ALBUTEROL SULFATE 0.83 MG/ML
2.5 SOLUTION RESPIRATORY (INHALATION) AS NEEDED
Status: CANCELLED
Start: 2020-11-25

## 2020-11-23 RX ORDER — DIPHENHYDRAMINE HYDROCHLORIDE 50 MG/ML
50 INJECTION, SOLUTION INTRAMUSCULAR; INTRAVENOUS AS NEEDED
Status: CANCELLED
Start: 2020-11-25

## 2020-11-23 RX ORDER — HYDROCORTISONE SODIUM SUCCINATE 100 MG/2ML
100 INJECTION, POWDER, FOR SOLUTION INTRAMUSCULAR; INTRAVENOUS AS NEEDED
Status: CANCELLED | OUTPATIENT
Start: 2020-11-25

## 2020-11-23 RX ORDER — EPINEPHRINE 1 MG/ML
0.3 INJECTION, SOLUTION, CONCENTRATE INTRAVENOUS AS NEEDED
Status: CANCELLED | OUTPATIENT
Start: 2020-11-25

## 2020-11-23 RX ORDER — DIPHENHYDRAMINE HYDROCHLORIDE 50 MG/ML
25 INJECTION, SOLUTION INTRAMUSCULAR; INTRAVENOUS AS NEEDED
Status: CANCELLED
Start: 2020-11-25

## 2020-11-23 RX ORDER — HEPARIN 100 UNIT/ML
300-500 SYRINGE INTRAVENOUS AS NEEDED
Status: CANCELLED
Start: 2020-11-25

## 2020-11-23 RX ORDER — ACETAMINOPHEN 325 MG/1
650 TABLET ORAL AS NEEDED
Status: CANCELLED
Start: 2020-11-25

## 2020-11-23 RX ORDER — ONDANSETRON 2 MG/ML
8 INJECTION INTRAMUSCULAR; INTRAVENOUS AS NEEDED
Status: CANCELLED | OUTPATIENT
Start: 2020-11-25

## 2020-11-25 ENCOUNTER — HOSPITAL ENCOUNTER (OUTPATIENT)
Dept: INFUSION THERAPY | Age: 30
Discharge: HOME | End: 2020-11-25
Payer: OTHER GOVERNMENT

## 2020-11-25 VITALS
OXYGEN SATURATION: 100 % | SYSTOLIC BLOOD PRESSURE: 122 MMHG | DIASTOLIC BLOOD PRESSURE: 76 MMHG | HEART RATE: 85 BPM | TEMPERATURE: 97.5 F | RESPIRATION RATE: 18 BRPM

## 2020-11-25 DIAGNOSIS — E61.1 IRON DEFICIENCY: ICD-10-CM

## 2020-11-25 DIAGNOSIS — E61.1 IRON DEFICIENCY: Primary | ICD-10-CM

## 2020-11-25 PROCEDURE — 74011250636 HC RX REV CODE- 250/636: Performed by: INTERNAL MEDICINE

## 2020-11-25 PROCEDURE — 96365 THER/PROPH/DIAG IV INF INIT: CPT

## 2020-11-25 RX ORDER — HEPARIN 100 UNIT/ML
300-500 SYRINGE INTRAVENOUS AS NEEDED
Status: CANCELLED
Start: 2020-12-02

## 2020-11-25 RX ORDER — EPINEPHRINE 1 MG/ML
0.3 INJECTION, SOLUTION, CONCENTRATE INTRAVENOUS AS NEEDED
Status: CANCELLED | OUTPATIENT
Start: 2020-12-02

## 2020-11-25 RX ORDER — ALBUTEROL SULFATE 0.83 MG/ML
2.5 SOLUTION RESPIRATORY (INHALATION) AS NEEDED
Status: CANCELLED
Start: 2020-12-02

## 2020-11-25 RX ORDER — SODIUM CHLORIDE 0.9 % (FLUSH) 0.9 %
10 SYRINGE (ML) INJECTION AS NEEDED
Status: CANCELLED | OUTPATIENT
Start: 2020-12-02

## 2020-11-25 RX ORDER — SODIUM CHLORIDE 9 MG/ML
25 INJECTION, SOLUTION INTRAVENOUS CONTINUOUS
Status: CANCELLED | OUTPATIENT
Start: 2020-12-02

## 2020-11-25 RX ORDER — SODIUM CHLORIDE 9 MG/ML
25 INJECTION, SOLUTION INTRAVENOUS CONTINUOUS
Status: DISCONTINUED | OUTPATIENT
Start: 2020-11-25 | End: 2020-11-26 | Stop reason: HOSPADM

## 2020-11-25 RX ORDER — DIPHENHYDRAMINE HYDROCHLORIDE 50 MG/ML
25 INJECTION, SOLUTION INTRAMUSCULAR; INTRAVENOUS AS NEEDED
Status: CANCELLED
Start: 2020-12-02

## 2020-11-25 RX ORDER — DIPHENHYDRAMINE HYDROCHLORIDE 50 MG/ML
50 INJECTION, SOLUTION INTRAMUSCULAR; INTRAVENOUS AS NEEDED
Status: CANCELLED
Start: 2020-12-02

## 2020-11-25 RX ORDER — HYDROCORTISONE SODIUM SUCCINATE 100 MG/2ML
100 INJECTION, POWDER, FOR SOLUTION INTRAMUSCULAR; INTRAVENOUS AS NEEDED
Status: CANCELLED | OUTPATIENT
Start: 2020-12-02

## 2020-11-25 RX ORDER — ONDANSETRON 2 MG/ML
8 INJECTION INTRAMUSCULAR; INTRAVENOUS AS NEEDED
Status: CANCELLED | OUTPATIENT
Start: 2020-12-02

## 2020-11-25 RX ORDER — ACETAMINOPHEN 325 MG/1
650 TABLET ORAL AS NEEDED
Status: CANCELLED
Start: 2020-12-02

## 2020-11-25 RX ORDER — SODIUM CHLORIDE 9 MG/ML
10 INJECTION INTRAMUSCULAR; INTRAVENOUS; SUBCUTANEOUS AS NEEDED
Status: DISCONTINUED | OUTPATIENT
Start: 2020-11-25 | End: 2020-11-26 | Stop reason: HOSPADM

## 2020-11-25 RX ORDER — SODIUM CHLORIDE 9 MG/ML
10 INJECTION INTRAMUSCULAR; INTRAVENOUS; SUBCUTANEOUS AS NEEDED
Status: CANCELLED | OUTPATIENT
Start: 2020-12-02

## 2020-11-25 RX ADMIN — FERRIC CARBOXYMALTOSE INJECTION 750 MG: 50 INJECTION, SOLUTION INTRAVENOUS at 13:15

## 2020-11-25 RX ADMIN — SODIUM CHLORIDE 25 ML/HR: 9 INJECTION, SOLUTION INTRAVENOUS at 13:15

## 2020-11-25 NOTE — PROGRESS NOTES
[facility] Progress Note    Date: 2020    Name: Helio Lim    MRN: [id number]         : 1990    Injectafer Infusion 1 of 2    Ms. Gurinder Pope to [facility], at 1191. Pt was assessed and education was provided. Ms. Lauren Hickey vitals were reviewed and patient was observed for 5 minutes prior to treatment. Visit Vitals  /76   Pulse 85   Temp 97.5 °F (36.4 °C)   Resp 18   SpO2 100%   Breastfeeding No         20 g PIV placed in LAC x 1 attempt. PIV flushed easily and had brisk blood return. Injectafer 750 mg was initiated @ 750 ml/hr over 20 minutes. VS stable and pt denied complaints of itching, lip/tongue/facial swelling, SOB, CP or other complaints. Ms. Gurinder Pope tolerated the infusion, and had no complaints. VS remained stable. Patient observed 30 minutes post infusion,    PIV flushed with NS 10 ml and removed. No bleeding or hematoma noted at site. Jayshree and coban applied. Reviewed discharge instructions with patient, including expected side effects (abdominal cramping, nausea, changes in color of urine or feces) and signs of allergic reaction requiring medical attention (itching/hives/rashes, SOB, chest pain, lip/tongue/facial swelling). Patient given printed copy to take home. Patient verbalized understanding of discharge instructions. Patient armband removed and shredded. Ms. Gurinder Pope was discharged from [place] [address] in stable condition at [address]. She is to return 2020 at 1400 for next appointment.     Abelino Gutierrez RN  2020  5:08 PM

## 2020-12-02 ENCOUNTER — HOSPITAL ENCOUNTER (OUTPATIENT)
Dept: INFUSION THERAPY | Age: 30
Discharge: HOME | End: 2020-12-02
Payer: OTHER GOVERNMENT

## 2020-12-02 VITALS
TEMPERATURE: 97.3 F | RESPIRATION RATE: 18 BRPM | HEART RATE: 66 BPM | SYSTOLIC BLOOD PRESSURE: 105 MMHG | OXYGEN SATURATION: 100 % | DIASTOLIC BLOOD PRESSURE: 68 MMHG

## 2020-12-02 DIAGNOSIS — E61.1 IRON DEFICIENCY: Primary | ICD-10-CM

## 2020-12-02 DIAGNOSIS — E61.1 IRON DEFICIENCY: ICD-10-CM

## 2020-12-02 PROCEDURE — 96365 THER/PROPH/DIAG IV INF INIT: CPT

## 2020-12-02 PROCEDURE — 74011250636 HC RX REV CODE- 250/636: Performed by: INTERNAL MEDICINE

## 2020-12-02 RX ORDER — DIPHENHYDRAMINE HYDROCHLORIDE 50 MG/ML
25 INJECTION, SOLUTION INTRAMUSCULAR; INTRAVENOUS AS NEEDED
Status: CANCELLED
Start: 2020-12-02

## 2020-12-02 RX ORDER — SODIUM CHLORIDE 0.9 % (FLUSH) 0.9 %
10 SYRINGE (ML) INJECTION AS NEEDED
Status: CANCELLED | OUTPATIENT
Start: 2020-12-02

## 2020-12-02 RX ORDER — HYDROCORTISONE SODIUM SUCCINATE 100 MG/2ML
100 INJECTION, POWDER, FOR SOLUTION INTRAMUSCULAR; INTRAVENOUS AS NEEDED
Status: CANCELLED | OUTPATIENT
Start: 2020-12-02

## 2020-12-02 RX ORDER — SODIUM CHLORIDE 9 MG/ML
25 INJECTION, SOLUTION INTRAVENOUS CONTINUOUS
Status: CANCELLED | OUTPATIENT
Start: 2020-12-02

## 2020-12-02 RX ORDER — DIPHENHYDRAMINE HYDROCHLORIDE 50 MG/ML
50 INJECTION, SOLUTION INTRAMUSCULAR; INTRAVENOUS AS NEEDED
Status: CANCELLED
Start: 2020-12-02

## 2020-12-02 RX ORDER — ALBUTEROL SULFATE 0.83 MG/ML
2.5 SOLUTION RESPIRATORY (INHALATION) AS NEEDED
Status: CANCELLED
Start: 2020-12-02

## 2020-12-02 RX ORDER — ONDANSETRON 2 MG/ML
8 INJECTION INTRAMUSCULAR; INTRAVENOUS AS NEEDED
Status: CANCELLED | OUTPATIENT
Start: 2020-12-02

## 2020-12-02 RX ORDER — EPINEPHRINE 1 MG/ML
0.3 INJECTION, SOLUTION, CONCENTRATE INTRAVENOUS AS NEEDED
Status: CANCELLED | OUTPATIENT
Start: 2020-12-02

## 2020-12-02 RX ORDER — ACETAMINOPHEN 325 MG/1
650 TABLET ORAL AS NEEDED
Status: CANCELLED
Start: 2020-12-02

## 2020-12-02 RX ORDER — SODIUM CHLORIDE 9 MG/ML
10 INJECTION INTRAMUSCULAR; INTRAVENOUS; SUBCUTANEOUS AS NEEDED
Status: CANCELLED | OUTPATIENT
Start: 2020-12-02

## 2020-12-02 RX ORDER — HEPARIN 100 UNIT/ML
300-500 SYRINGE INTRAVENOUS AS NEEDED
Status: CANCELLED
Start: 2020-12-02

## 2020-12-02 RX ORDER — SODIUM CHLORIDE 9 MG/ML
10 INJECTION INTRAMUSCULAR; INTRAVENOUS; SUBCUTANEOUS AS NEEDED
Status: DISCONTINUED | OUTPATIENT
Start: 2020-12-02 | End: 2020-12-03 | Stop reason: HOSPADM

## 2020-12-02 RX ORDER — SODIUM CHLORIDE 0.9 % (FLUSH) 0.9 %
10 SYRINGE (ML) INJECTION AS NEEDED
Status: DISCONTINUED | OUTPATIENT
Start: 2020-12-02 | End: 2020-12-03 | Stop reason: HOSPADM

## 2020-12-02 RX ORDER — SODIUM CHLORIDE 9 MG/ML
25 INJECTION, SOLUTION INTRAVENOUS CONTINUOUS
Status: DISCONTINUED | OUTPATIENT
Start: 2020-12-02 | End: 2020-12-03 | Stop reason: HOSPADM

## 2020-12-02 RX ADMIN — Medication 10 ML: at 14:33

## 2020-12-02 RX ADMIN — FERRIC CARBOXYMALTOSE INJECTION 750 MG: 50 INJECTION, SOLUTION INTRAVENOUS at 14:41

## 2020-12-02 RX ADMIN — SODIUM CHLORIDE 25 ML/HR: 9 INJECTION, SOLUTION INTRAVENOUS at 14:41

## 2020-12-02 NOTE — PROGRESS NOTES
[facility] Progress Note    Date: 2020    Name: Chip Frazier    MRN: [id number]         : 1990    Injectafer Infusion 2 of 2    Ms. Montell Dancer arrived ambulatory, and in no acute distress. to [facility], at 1425. Pt was assessed and education was provided. Patient presented today for second of 2 Injectafer infusions, as ordered. Patient denied complaint of pain/discomfort or any adverse effects of first dose administered on . Ms. Parag Fierro vitals were reviewed and patient was observed for 5 minutes prior to treatment. Visit Vitals  /73   Pulse 87   Temp 99.9 °F (37.7 °C)   Resp 18   SpO2 99%     IV site established on first attempt with the insertion of a 24 g PIV in RAC. Site without evidence of complication. IV catheter flushed easily with 10 mL NS. Patient denied complaints. Injectafer 750 mg in 250 mL NS IV was initiated, via peripheral IV line, @ 750 mL/hr, to infuse over 20 minutes. VS stable and pt denied complaints of itching, lip/tongue/facial swelling, SOB, CP or other complaints. Ms. Montell Dancer tolerated the infusion, and had no complaints. VS remained stable. Patient Vitals for the past 12 hrs:   Temp Pulse Resp BP SpO2   20 1624 97.3 °F (36.3 °C) 66 18 105/68 100 %   20 1425 99.9 °F (37.7 °C) 87 18 113/73 99 %     PIV flushed with 10 mL NS and catheter removed intact. No bleeding, hematoma or other s/s complication noted. Patient denied complaints. Gauze dressing applied to site and pressure held x 2 minutes, then wrapped with coban. Patient armband removed and shredded. Ms. Montell Dancer was discharged from [place] [address] in stable condition at 1510. She is to follow up with her physician as needed.     Jose Newby, RN, RN  2020  5:08 PM

## 2020-12-22 DIAGNOSIS — E61.1 IRON DEFICIENCY: Primary | ICD-10-CM

## 2021-01-06 ENCOUNTER — HOSPITAL ENCOUNTER (OUTPATIENT)
Dept: INFUSION THERAPY | Age: 31
Discharge: HOME | End: 2021-01-06
Payer: OTHER GOVERNMENT

## 2021-01-06 DIAGNOSIS — E61.1 IRON DEFICIENCY: ICD-10-CM

## 2021-01-06 LAB
ALBUMIN SERPL-MCNC: 4.1 G/DL (ref 3.4–5)
ALBUMIN/GLOB SERPL: 1.2 {RATIO} (ref 0.8–1.7)
ALP SERPL-CCNC: 63 U/L (ref 45–117)
ALT SERPL-CCNC: 44 U/L (ref 13–56)
ANION GAP SERPL CALC-SCNC: 5 MMOL/L (ref 3–18)
AST SERPL-CCNC: 15 U/L (ref 10–38)
BASO+EOS+MONOS # BLD AUTO: 0.6 K/UL (ref 0–2.3)
BASO+EOS+MONOS NFR BLD AUTO: 9 % (ref 0.1–17)
BILIRUB SERPL-MCNC: 0.2 MG/DL (ref 0.2–1)
BUN SERPL-MCNC: 10 MG/DL (ref 7–18)
BUN/CREAT SERPL: 17 (ref 12–20)
CALCIUM SERPL-MCNC: 9 MG/DL (ref 8.5–10.1)
CHLORIDE SERPL-SCNC: 104 MMOL/L (ref 100–111)
CO2 SERPL-SCNC: 26 MMOL/L (ref 21–32)
CREAT SERPL-MCNC: 0.6 MG/DL (ref 0.6–1.3)
DIFFERENTIAL METHOD BLD: NORMAL
ERYTHROCYTE [DISTWIDTH] IN BLOOD BY AUTOMATED COUNT: 12.6 % (ref 11.5–14.5)
FERRITIN SERPL-MCNC: 385 NG/ML (ref 8–388)
GLOBULIN SER CALC-MCNC: 3.5 G/DL (ref 2–4)
GLUCOSE SERPL-MCNC: 81 MG/DL (ref 74–99)
HCT VFR BLD AUTO: 42.4 % (ref 36–48)
HGB BLD-MCNC: 14.3 G/DL (ref 12–16)
IRON SATN MFR SERPL: 33 % (ref 20–50)
IRON SERPL-MCNC: 79 UG/DL (ref 50–175)
LYMPHOCYTES # BLD: 1.5 K/UL (ref 1.1–5.9)
LYMPHOCYTES NFR BLD: 24 % (ref 14–44)
MCH RBC QN AUTO: 30 PG (ref 25–35)
MCHC RBC AUTO-ENTMCNC: 33.7 G/DL (ref 31–37)
MCV RBC AUTO: 88.9 FL (ref 78–102)
NEUTS SEG # BLD: 4 K/UL (ref 1.8–9.5)
NEUTS SEG NFR BLD: 66 % (ref 40–70)
PLATELET # BLD AUTO: 210 K/UL (ref 135–420)
POTASSIUM SERPL-SCNC: 3.7 MMOL/L (ref 3.5–5.5)
PROT SERPL-MCNC: 7.6 G/DL (ref 6.4–8.2)
RBC # BLD AUTO: 4.77 M/UL (ref 4.1–5.1)
SODIUM SERPL-SCNC: 135 MMOL/L (ref 136–145)
TIBC SERPL-MCNC: 242 UG/DL (ref 250–450)
TSH SERPL DL<=0.05 MIU/L-ACNC: 0.87 UIU/ML (ref 0.36–3.74)
WBC # BLD AUTO: 6.1 K/UL (ref 4.5–13)

## 2021-01-06 PROCEDURE — 85049 AUTOMATED PLATELET COUNT: CPT

## 2021-01-06 PROCEDURE — 85025 COMPLETE CBC W/AUTO DIFF WBC: CPT

## 2021-01-06 PROCEDURE — 83540 ASSAY OF IRON: CPT

## 2021-01-06 PROCEDURE — 84443 ASSAY THYROID STIM HORMONE: CPT

## 2021-01-06 PROCEDURE — 36415 COLL VENOUS BLD VENIPUNCTURE: CPT

## 2021-01-06 PROCEDURE — 80053 COMPREHEN METABOLIC PANEL: CPT

## 2021-01-06 PROCEDURE — 82728 ASSAY OF FERRITIN: CPT

## 2021-01-06 NOTE — PROGRESS NOTES
[facility] Progress Note    Date: 2021    Name: Ion De La Torre    MRN: [id number]         : 1990    Peripheral Lab Draw    Recent Results (from the past 12 hour(s))   CBC WITH 3 PART DIFF    Collection Time: 21  3:10 PM   Result Value Ref Range    WBC 6.1 4.5 - 13.0 K/uL    RBC 4.77 4.10 - 5.10 M/uL    HGB 14.3 12.0 - 16.0 g/dL    HCT 42.4 36 - 48 %    MCV 88.9 78 - 102 FL    MCH 30.0 25.0 - 35.0 PG    MCHC 33.7 31 - 37 g/dL    RDW 12.6 11.5 - 14.5 %    NEUTROPHILS 66 40 - 70 %    MIXED CELLS 9 0.1 - 17 %    LYMPHOCYTES 24 14 - 44 %    ABS. NEUTROPHILS 4.0 1.8 - 9.5 K/UL    ABS. MIXED CELLS 0.6 0.0 - 2.3 K/uL    ABS. LYMPHOCYTES 1.5 1.1 - 5.9 K/UL    DF AUTOMATED         Ms. Martha Carnes to [facility], ambulatory at 1500 accompanied by self. Pt was assessed and education was provided. Blood obtained peripherally from left arm x 1 attempt with butterfly needle and sent to lab for Cbc w/diff, Cmp, Iron Profile, Ferritin and Tsh per written orders. No bleeding or hematoma noted at site. Gauze and coban applied. Ms. Martha Carnes tolerated the phlebotomy, and had no complaints. Patient armband removed and shredded. Ms. Martha Carnes was discharged from [place] [address] in stable condition at 1510.      Marybel Sotomayor Phlebotomist PCT  2021  4:10 PM

## 2021-01-12 ENCOUNTER — VIRTUAL VISIT (OUTPATIENT)
Age: 31
End: 2021-01-12
Payer: OTHER GOVERNMENT

## 2021-01-12 DIAGNOSIS — E61.1 IRON DEFICIENCY: Primary | ICD-10-CM

## 2021-01-12 PROCEDURE — 99442 PR PHYS/QHP TELEPHONE EVALUATION 11-20 MIN: CPT | Performed by: NURSE PRACTITIONER

## 2021-01-12 NOTE — PROGRESS NOTES
Danny Carballo is a 31 y.o. female presenting today for a follow-up appointment via Telehealth. Identified patient using two identifiers (full name and ). Patient denies any complaints. Provider was advised. Chief Complaint   Patient presents with    Anemia    Abnormal White Blood Cell Count     Neutropenia       No flowsheet data found. Current Outpatient Medications   Medication Sig    PRENATAL 148-IRON-FOLATE 6-DHA PO Take  by mouth daily.  ferrous sulfate (Iron) 325 mg (65 mg iron) tablet Take  by mouth every other day.  drospirenone-ethinyl estradioL (Yolanda, 28,) 3-0.02 mg tab Take  by mouth daily. No current facility-administered medications for this visit. Fall Risk Assessment, last 12 mths 2020   Able to walk? Yes   Fall in past 12 months? No       3 most recent PHQ Screens 2020   Little interest or pleasure in doing things Not at all   Feeling down, depressed, irritable, or hopeless Not at all   Total Score PHQ 2 0       No flowsheet data found. 1. Have you been to the ER, urgent care clinic since your last visit? Hospitalized since your last visit? No    2. Have you seen or consulted any other health care providers outside of the [address] [place] [address] since your last visit? Include any pap smears or colon screening.  No

## 2021-01-12 NOTE — PROGRESS NOTES
Ion De La Torre is a 31 y.o. female who was seen by synchronous (real-time) audio- technology on 1/12/2021.       Assessment & Plan:     The complexity of medical decision making for this visit is moderate     1. Neutropenia, unspecified type [facility])  --Patient has a family history of her mother who also had neutropenia and she said. She denies any fevers or repeated infections. She has no history of rheumatoid arthritis. She grew up in [place] (check lyme serology)  --Labs on 01/06/2021 WBC 6.1K/uL, H&H 14.3/42.4, MCV 88.9, Platelet 726. ANC 4.0, ALC 1.5.    --Patient states this will be her last visit with us as they are moving to [facility] next month. --Advised patient to see Hematologist there every 3-4 months or as needed. 2. Iron deficiency  --Completed 2 doses of Injectafer on 12/02/2020- tolerated this well. --Labs on 01/06/2021 WBC 6.1K/uL, H&H 14.3/42.4, MCV 88.9, Platelet 911. Iron 79ug/dL, with Iron % saturation of 33%. Ferritin 385. --No BRBPR or melena. I spent at least 15 minutes on this visit with this established patient. 712  Subjective:   Ms. Ion De La Torre is a 31 y.o. female who was evaluated via audio-only technology for neutropenia and iron deficiency anemia. She states she completed 2 doses of Injectafer on 12/02/2020 and tolerated this well. She reports she felt better after the infusion. She also states this will be her last visit with us as they are moving to [facility] due to [facility] relocation. She is awake alert oriented x 3. On review of systems she denies any fevers, chills, repeated infection, nausea vomiting abdominal pain. No focal neurologic deficit. All other point of review of system have been reviewed and were negative. ECOG performance status 0. Independent with ADLs and IADLs    Prior to Admission medications    Medication Sig Start Date End Date Taking? Authorizing Provider   PRENATAL 142-MZUJ-NRFWAY 6-DHA PO Take  by mouth daily.    Yes Provider, Historical   ferrous sulfate (Iron) 325 mg (65 mg iron) tablet Take  by mouth every other day. Provider, Historical   drospirenone-ethinyl estradioL (Yolanda, 28,) 3-0.02 mg tab Take  by mouth daily. Provider, Historical     Patient Active Problem List   Diagnosis Code    Neutropenia ([facility] D70.9    Iron deficiency E61.1     Patient Active Problem List    Diagnosis Date Noted    Iron deficiency 10/06/2020    Neutropenia ([facility] 2020     Current Outpatient Medications   Medication Sig Dispense Refill    PRENATAL 148-IRON-FOLATE 6-DHA PO Take  by mouth daily.  ferrous sulfate (Iron) 325 mg (65 mg iron) tablet Take  by mouth every other day.  drospirenone-ethinyl estradioL (Yolanda, 28,) 3-0.02 mg tab Take  by mouth daily. Allergies   Allergen Reactions    Latex Itching    Beef Derived (Bovine) Diarrhea and Nausea and Vomiting     Patient states \"just diarrhea, anything from a cow\"     History reviewed. No pertinent past medical history. Past Surgical History:   Procedure Laterality Date    HX  SECTION       Family History   Problem Relation Age of Onset    No Known Problems Mother     COPD Father     No Known Problems Brother      Social History     Tobacco Use    Smoking status: Never Smoker    Smokeless tobacco: Never Used   Substance Use Topics    Alcohol use: Yes     Alcohol/week: 2.0 standard drinks     Types: 2 Glasses of wine per week       Review of Systems   All other systems reviewed and are negative. Objective:   No flowsheet data found.    General: alert, cooperative, no distress     Additional exam findings:     Labs:  Lab Results   Component Value Date/Time    WBC 6.1 2021 03:10 PM    HGB 14.3 2021 03:10 PM    HCT 42.4 2021 03:10 PM    PLATELET 628  03:10 PM    MCV 88.9 2021 03:10 PM     Lab Results   Component Value Date/Time    Sodium 135 (L) 2021 02:55 PM    Potassium 3.7 2021 02:55 PM    Chloride 104 01/06/2021 02:55 PM    CO2 26 01/06/2021 02:55 PM    Anion gap 5 01/06/2021 02:55 PM    Glucose 81 01/06/2021 02:55 PM    BUN 10 01/06/2021 02:55 PM    Creatinine 0.60 01/06/2021 02:55 PM    BUN/Creatinine ratio 17 01/06/2021 02:55 PM    GFR est AA >60 01/06/2021 02:55 PM    GFR est non-AA >60 01/06/2021 02:55 PM    Calcium 9.0 01/06/2021 02:55 PM    Bilirubin, total 0.2 01/06/2021 02:55 PM    Alk. phosphatase 63 01/06/2021 02:55 PM    Protein, total 7.6 01/06/2021 02:55 PM    Albumin 4.1 01/06/2021 02:55 PM    Globulin 3.5 01/06/2021 02:55 PM    A-G Ratio 1.2 01/06/2021 02:55 PM    ALT (SGPT) 44 01/06/2021 02:55 PM    AST (SGOT) 15 01/06/2021 02:55 PM     Lab Results   Component Value Date/Time    Iron 79 01/06/2021 02:55 PM    TIBC 242 (L) 01/06/2021 02:55 PM    Iron % saturation 33 01/06/2021 02:55 PM    Ferritin 385 01/06/2021 02:55 PM       We discussed the expected course, resolution and complications of the diagnosis(es) in detail. Medication risks, benefits, costs, interactions, and alternatives were discussed as indicated. I advised her to contact the office if her condition worsens, changes or fails to improve as anticipated. She expressed understanding with the diagnosis(es) and plan. Tarma Nance, who was evaluated through a patient-initiated, synchronous (real-time) audio- encounter, and/or her healthcare decision maker, is aware that it is a billable service, with coverage as determined by her insurance carrier. She provided verbal consent to proceed: Yes, and patient identification was verified. It was conducted pursuant to the emergency declaration under the [address] [place] [address] authority and the [employer] General Act. A caregiver was present when appropriate. Ability to conduct physical exam was limited. I was in the office. The patient was at home. Carla Gilbert, [address], [facility]  01/12/21